# Patient Record
Sex: MALE | Race: WHITE
[De-identification: names, ages, dates, MRNs, and addresses within clinical notes are randomized per-mention and may not be internally consistent; named-entity substitution may affect disease eponyms.]

---

## 2020-02-03 ENCOUNTER — HOSPITAL ENCOUNTER (OUTPATIENT)
Dept: HOSPITAL 8 - OR | Age: 45
Discharge: HOME | End: 2020-02-03
Attending: SURGERY
Payer: COMMERCIAL

## 2020-02-03 VITALS — SYSTOLIC BLOOD PRESSURE: 135 MMHG | DIASTOLIC BLOOD PRESSURE: 88 MMHG

## 2020-02-03 VITALS — HEIGHT: 72 IN | BODY MASS INDEX: 39.71 KG/M2 | WEIGHT: 293.21 LBS

## 2020-02-03 DIAGNOSIS — F17.220: ICD-10-CM

## 2020-02-03 DIAGNOSIS — E78.00: ICD-10-CM

## 2020-02-03 DIAGNOSIS — Z79.899: ICD-10-CM

## 2020-02-03 DIAGNOSIS — F12.90: ICD-10-CM

## 2020-02-03 DIAGNOSIS — J45.909: ICD-10-CM

## 2020-02-03 DIAGNOSIS — D17.0: Primary | ICD-10-CM

## 2020-02-03 DIAGNOSIS — E66.01: ICD-10-CM

## 2020-02-03 PROCEDURE — 11426 EXC H-F-NK-SP B9+MARG >4 CM: CPT

## 2020-02-03 PROCEDURE — 11422 EXC H-F-NK-SP B9+MARG 1.1-2: CPT

## 2020-02-03 PROCEDURE — 88305 TISSUE EXAM BY PATHOLOGIST: CPT

## 2023-12-17 ENCOUNTER — OFFICE VISIT (OUTPATIENT)
Dept: URGENT CARE | Facility: PHYSICIAN GROUP | Age: 48
End: 2023-12-17
Payer: COMMERCIAL

## 2023-12-17 VITALS
SYSTOLIC BLOOD PRESSURE: 108 MMHG | BODY MASS INDEX: 41.45 KG/M2 | DIASTOLIC BLOOD PRESSURE: 64 MMHG | HEART RATE: 108 BPM | OXYGEN SATURATION: 95 % | HEIGHT: 72 IN | WEIGHT: 306 LBS | RESPIRATION RATE: 16 BRPM | TEMPERATURE: 98.3 F

## 2023-12-17 DIAGNOSIS — U07.1 COVID-19: ICD-10-CM

## 2023-12-17 PROCEDURE — 3074F SYST BP LT 130 MM HG: CPT

## 2023-12-17 PROCEDURE — 3078F DIAST BP <80 MM HG: CPT

## 2023-12-17 PROCEDURE — 99204 OFFICE O/P NEW MOD 45 MIN: CPT

## 2023-12-17 ASSESSMENT — ENCOUNTER SYMPTOMS
SORE THROAT: 0
RHINORRHEA: 1
FEVER: 1
COUGH: 1
CHILLS: 1
MYALGIAS: 1
DIARRHEA: 0
VOMITING: 0

## 2023-12-17 NOTE — PROGRESS NOTES
Subjective     Kendell Copeland is a 48 y.o. male who presents with Coronavirus Screening (Pt. Tested Positive at home for Covid yesterday.  He is having headache, sinus congestion and pressure, fever, chills.  Hx of Asthma. Pt. Is requesting Paxlovid. )            URI   This is a new problem. The current episode started in the past 7 days. The problem has been gradually worsening. Maximum temperature: tactile fever. Associated symptoms include congestion, coughing and rhinorrhea. Pertinent negatives include no diarrhea, sore throat or vomiting. Treatments tried: Dayquil.     Patient presents with symptoms started 2 days ago.  He endorses fever, chills, fatigue, rhinorrhea, nasal congestion, cough, myalgias.  He denies any sore throat, vomiting, or diarrhea.  He has been taking OTC medications such as DayQuil, providing some relief.  Reports exposure to COVID at work.  His father also had COVID and he was exposed to him.  Patient reports previous vaccination for COVID.  Patient tested positive for COVID at home.      Patient's current problem list, medications, and past medical/surgical history were reviewed in Epic.    PMH:  has no past medical history on file.  MEDS: No current outpatient medications on file.  ALLERGIES: No Known Allergies  SURGHX: No past surgical history on file.  SOCHX:    FH: Reviewed with patient, not pertinent to this visit.       Review of Systems   Constitutional:  Positive for chills, fever and malaise/fatigue.   HENT:  Positive for congestion and rhinorrhea. Negative for sore throat.    Respiratory:  Positive for cough.    Gastrointestinal:  Negative for diarrhea and vomiting.   Musculoskeletal:  Positive for myalgias.              Objective     /64   Pulse (!) 108   Temp 36.8 °C (98.3 °F) (Temporal)   Resp 16   Ht 1.829 m (6')   Wt (!) 139 kg (306 lb)   SpO2 95%   BMI 41.50 kg/m²      Physical Exam  Constitutional:       Appearance: Normal appearance.   HENT:      Head:  Normocephalic.      Nose: Congestion present.   Eyes:      Extraocular Movements: Extraocular movements intact.   Cardiovascular:      Rate and Rhythm: Normal rate and regular rhythm.      Pulses: Normal pulses.      Heart sounds: Normal heart sounds.   Pulmonary:      Effort: Pulmonary effort is normal.      Breath sounds: Normal breath sounds.   Musculoskeletal:         General: Normal range of motion.      Cervical back: Normal range of motion.   Skin:     General: Skin is warm.   Neurological:      General: No focal deficit present.      Mental Status: He is alert.   Psychiatric:         Mood and Affect: Mood normal.         Behavior: Behavior normal.                     Assessment & Plan        1. COVID-19    - Nirmatrelvir&Ritonavir 300/100 20 x 150 MG & 10 x 100MG Tablet Therapy Pack; Take 300 mg nirmatrelvir (two 150 mg tablets) with 100 mg ritonavir (one 100 mg tablet) by mouth, with all three tablets taken together twice daily for 5 days.  Dispense: 30 Each; Refill: 0       Patient's presentation is consistent with COVID-19 infection.  He is prescribed Paxlovid twice daily for 5 days.  Advised on symptomatic treatment at home.  Instructed to quarantine per CDC guidelines.  Instructed to return to clinic with worsening or persistent symptoms. Discussed treatment plan with patient, he is agreeable and verbalized understanding.  Educated patient on signs and symptoms watch out for, when to return to the clinic or go to the ER.    Recommended supportive treatment at home:  OTC Tylenol or Motrin for fever/discomfort.  OTC supportive care for nasal congestion - saline nasal spray/Flonase nasal spray and/or netipot  Humidifier and steam inhalation/warm showers.  Increase oral fluid intake.  Warm saline gargles, cool liquids, throat sprays for sore throat.    Electronically Signed by ELLE Becker

## 2025-04-05 ENCOUNTER — OFFICE VISIT (OUTPATIENT)
Dept: URGENT CARE | Facility: PHYSICIAN GROUP | Age: 50
End: 2025-04-05
Payer: COMMERCIAL

## 2025-04-05 VITALS
TEMPERATURE: 96.5 F | BODY MASS INDEX: 39.68 KG/M2 | RESPIRATION RATE: 16 BRPM | HEIGHT: 72 IN | HEART RATE: 87 BPM | OXYGEN SATURATION: 95 % | SYSTOLIC BLOOD PRESSURE: 140 MMHG | DIASTOLIC BLOOD PRESSURE: 90 MMHG | WEIGHT: 293 LBS

## 2025-04-05 DIAGNOSIS — J45.21 MILD INTERMITTENT ASTHMA WITH ACUTE EXACERBATION: ICD-10-CM

## 2025-04-05 DIAGNOSIS — R50.9 FEVER, UNSPECIFIED FEVER CAUSE: ICD-10-CM

## 2025-04-05 DIAGNOSIS — B34.9 VIRAL ILLNESS: ICD-10-CM

## 2025-04-05 LAB
FLUAV RNA SPEC QL NAA+PROBE: NEGATIVE
FLUBV RNA SPEC QL NAA+PROBE: NEGATIVE
RSV RNA SPEC QL NAA+PROBE: NEGATIVE
SARS-COV-2 RNA RESP QL NAA+PROBE: NEGATIVE

## 2025-04-05 PROCEDURE — 99213 OFFICE O/P EST LOW 20 MIN: CPT | Performed by: PHYSICIAN ASSISTANT

## 2025-04-05 PROCEDURE — 3080F DIAST BP >= 90 MM HG: CPT | Performed by: PHYSICIAN ASSISTANT

## 2025-04-05 PROCEDURE — 3077F SYST BP >= 140 MM HG: CPT | Performed by: PHYSICIAN ASSISTANT

## 2025-04-05 PROCEDURE — 0241U POCT CEPHEID COV-2, FLU A/B, RSV - PCR: CPT | Performed by: PHYSICIAN ASSISTANT

## 2025-04-05 RX ORDER — ALBUTEROL SULFATE 90 UG/1
2 INHALANT RESPIRATORY (INHALATION) EVERY 6 HOURS PRN
Qty: 8.5 G | Refills: 0 | Status: SHIPPED | OUTPATIENT
Start: 2025-04-05

## 2025-04-05 RX ORDER — DEXTROMETHORPHAN HYDROBROMIDE AND PROMETHAZINE HYDROCHLORIDE 15; 6.25 MG/5ML; MG/5ML
5 SYRUP ORAL 3 TIMES DAILY PRN
Qty: 120 ML | Refills: 0 | Status: SHIPPED | OUTPATIENT
Start: 2025-04-05

## 2025-04-05 RX ORDER — PREDNISONE 20 MG/1
TABLET ORAL
Qty: 10 TABLET | Refills: 0 | Status: SHIPPED | OUTPATIENT
Start: 2025-04-05

## 2025-04-05 RX ORDER — ATORVASTATIN CALCIUM 10 MG/1
10 TABLET, FILM COATED ORAL NIGHTLY
COMMUNITY

## 2025-04-05 ASSESSMENT — ENCOUNTER SYMPTOMS
SHORTNESS OF BREATH: 1
CHILLS: 1
RHINORRHEA: 1
HEMOPTYSIS: 0
FEVER: 1
GASTROINTESTINAL NEGATIVE: 1
PALPITATIONS: 0
MYALGIAS: 1
SORE THROAT: 1
COUGH: 1
WHEEZING: 0
HEADACHES: 1

## 2025-04-05 NOTE — LETTER
April 5, 2025         Patient: Kendell Copeland   YOB: 1975   Date of Visit: 4/5/2025           To Whom it May Concern:    Kendell Copeland was seen in my clinic on 4/5/2025. Please excuse any absences from work on 4/3 and 4/4 and next week if not improving due to acute illness.        If you have any questions or concerns, please don't hesitate to call.        Sincerely,           Stanley Santana P.A.-C.  Electronically Signed

## 2025-04-05 NOTE — PROGRESS NOTES
Subjective     Kendell Copeland is a very pleasant 49 y.o. male who presents with Cough, Headache, Fever (Pt states he's had symptoms for three days, started Wednesday afternoon. OTC medication not helping), and Body Aches            Cough  This is a new problem. The current episode started in the past 7 days. The problem has been gradually worsening. The problem occurs every few minutes. The cough is Productive of sputum. Associated symptoms include chills, a fever, headaches, myalgias, nasal congestion, postnasal drip, rhinorrhea, a sore throat and shortness of breath. Pertinent negatives include no chest pain, ear congestion, ear pain, hemoptysis or wheezing. He has tried OTC cough suppressant for the symptoms. The treatment provided mild relief. His past medical history is significant for asthma.       PMH:  has no past medical history on file.  MEDS:   Current Outpatient Medications:     atorvastatin (LIPITOR) 10 MG Tab, Take 10 mg by mouth every evening., Disp: , Rfl:     metFORMIN (GLUCOPHAGE) 500 MG Tab, Take 500 mg by mouth every day., Disp: , Rfl:     albuterol 108 (90 Base) MCG/ACT Aero Soln inhalation aerosol, Inhale 2 Puffs every 6 hours as needed for Shortness of Breath., Disp: 8.5 g, Rfl: 0    promethazine-dextromethorphan (PROMETHAZINE-DM) 6.25-15 MG/5ML syrup, Take 5 mL by mouth 3 times a day as needed for Cough., Disp: 120 mL, Rfl: 0    predniSONE (DELTASONE) 20 MG Tab, Take 2 tabs p.o. daily x 5 days, Disp: 10 Tablet, Rfl: 0  ALLERGIES: No Known Allergies  SURGHX: No past surgical history on file.  SOCHX:    FH: family history is not on file.      Review of Systems   Constitutional:  Positive for chills and fever.   HENT:  Positive for congestion, postnasal drip, rhinorrhea and sore throat. Negative for ear pain.    Respiratory:  Positive for cough and shortness of breath. Negative for hemoptysis and wheezing.    Cardiovascular:  Negative for chest pain, palpitations and leg swelling.    Gastrointestinal: Negative.    Musculoskeletal:  Positive for myalgias.   Neurological:  Positive for headaches.       Medications, Allergies, and current problem list reviewed today in Epic           Objective     BP (!) 140/90   Pulse 87   Temp 35.8 °C (96.5 °F) (Temporal)   Resp 16   Ht 1.829 m (6')   Wt (!) 133 kg (293 lb)   SpO2 95%   BMI 39.74 kg/m²      Physical Exam  Vitals and nursing note reviewed.   Constitutional:       General: He is not in acute distress.     Appearance: Normal appearance. He is well-developed. He is not ill-appearing, toxic-appearing or diaphoretic.   HENT:      Head: Normocephalic and atraumatic.      Right Ear: Tympanic membrane, ear canal and external ear normal.      Left Ear: Tympanic membrane, ear canal and external ear normal.      Nose: Congestion and rhinorrhea present.      Mouth/Throat:      Mouth: Mucous membranes are moist.      Pharynx: Oropharynx is clear. No oropharyngeal exudate or posterior oropharyngeal erythema.   Eyes:      General:         Right eye: No discharge.         Left eye: No discharge.      Conjunctiva/sclera: Conjunctivae normal.   Cardiovascular:      Rate and Rhythm: Normal rate and regular rhythm.      Pulses: Normal pulses.      Heart sounds: Normal heart sounds. No murmur heard.  Pulmonary:      Effort: Pulmonary effort is normal. No respiratory distress.      Breath sounds: No stridor. Decreased breath sounds present. No wheezing, rhonchi or rales.      Comments: Productive, spasmodic cough  Chest:      Chest wall: No tenderness.   Musculoskeletal:         General: No swelling or tenderness.      Cervical back: Normal range of motion and neck supple.      Right lower leg: No edema.      Left lower leg: No edema.   Lymphadenopathy:      Cervical: No cervical adenopathy.   Skin:     General: Skin is warm and dry.   Neurological:      General: No focal deficit present.      Mental Status: He is alert and oriented to person, place, and time.  Mental status is at baseline.   Psychiatric:         Mood and Affect: Mood normal.         Behavior: Behavior normal.         Thought Content: Thought content normal.         Judgment: Judgment normal.                                  Assessment & Plan  Fever, unspecified fever cause  COVID, flu, RSV: Negative  Orders:    POCT CEPHEID COV-2, FLU A/B, RSV - PCR    Viral illness  No clinical symptoms/signs of focal bacterial infection.  Patient will be treated for self-limiting viral URI with asthma exacerbation with OTC meds, conservative measures, and symptomatic relief.  ER and red flag symptoms discussed at length.      Orders:    albuterol 108 (90 Base) MCG/ACT Aero Soln inhalation aerosol; Inhale 2 Puffs every 6 hours as needed for Shortness of Breath.    promethazine-dextromethorphan (PROMETHAZINE-DM) 6.25-15 MG/5ML syrup; Take 5 mL by mouth 3 times a day as needed for Cough.    predniSONE (DELTASONE) 20 MG Tab; Take 2 tabs p.o. daily x 5 days    Mild intermittent asthma with acute exacerbation    Orders:    albuterol 108 (90 Base) MCG/ACT Aero Soln inhalation aerosol; Inhale 2 Puffs every 6 hours as needed for Shortness of Breath.    predniSONE (DELTASONE) 20 MG Tab; Take 2 tabs p.o. daily x 5 days           I personally reviewed prior external notes and test results pertinent to today's visit. Return to clinic or go to ED if symptoms worsen or persist. Red flag symptoms and indications for ED discussed at length. Patient/Parent/Guardian voices understanding.  AVS with post-visit instructions printed and provided or given verbally.  Follow-up with your primary care provider in 3-5 days. All side effects and potential interactions of prescribed medication discussed including allergic response, GI upset, tendon injury, rash, sedation, OCP effectiveness, etc.    Please note that this dictation was created using voice recognition software. I have made every reasonable attempt to correct obvious errors, but I  expect that there are errors of grammar and possibly content that I did not discover before finalizing the note.